# Patient Record
(demographics unavailable — no encounter records)

---

## 2025-02-23 NOTE — PHYSICAL EXAM
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

## 2025-02-23 NOTE — DATA REVIEWED
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

## 2025-02-23 NOTE — ASSESSMENT
[FreeTextEntry1] : We reviewed his CT and I again recommended that he discuss surgery with Kurt Mendieta or Arthur.

## 2025-02-23 NOTE — HISTORY OF PRESENT ILLNESS
[de-identified] : A month ago he developed R pain; an UC gave him drops with improvement of the pain, but his hearing remains decreased with fullness and worsening of baseline nonpulsatile tinnitus that has been on the R since his early 30s when he had a bad ear infection with a spontaneous perf.   Four days ago he noticed that his balance was off worse when he "itches" the R ear. Two days ago when lying in bed he had some spinning.  No headaches or other neuro complaints. HIV pos w/ an undetectable VL.

## 2025-02-24 NOTE — DATA REVIEWED
[de-identified] : Audiogram personally reviewed and interpreted and my findings are as follows (2/11/25): Right: SRT 50dB; %; Type A tymp; max CHL Left: SRT 15dB; %; Type A tymp; normal to mild high freq SNHL [de-identified] : CT temporal bone scan slides visually reviewed and personally interpreted as follows (2/13/25): right mastoid opacification, incus eroded, otic capsule intact, scutal erosion, thin tegmen tymani, facial nerve likely not dehiscence; normal left temporal bone  CT temporal bone scan radiology read: IMPRESSION:  Findings suggestive of pars flaccida cholesteatoma with the opacification of Prussak space and erosion of the scutum and opacification extending to fill the mastoid cavity and epitympanum. Sinus tympani and the facial nerve recess are opacified. Erosive changes are noted in the ossicles, most clearly involving the malleus and incus. Thinning and questionable dehiscence of the tegmen mastoideum.  No definite dehiscence of the semicircular canals of the right ear. Facial nerve canal appears relatively well preserved. Small amount of soft tissue opacification of the right round window is noted.

## 2025-02-24 NOTE — PHYSICAL EXAM
[TextEntry] : General: AAO, no significant distress Psychiatric: affect pleasant and within normal limits Eyes: relatively symmetric, no obvious nystagmus Skin: no significant lesions on face Nose: no significant lesions; patent. Oral Cavity & Oropharynx: no significant deformity or lesions Neck/Lymphatics: no significant masses or abnormal cervical nodes palpated Respiratory: breathing comfortably; no significant distress Neurologic: cranial nerves II-XII grossly intact; EOMI Facial function: symmetric   Ear examination performed under binocular otologic microscope: Left Ear External: Pinna and periauricular area is normal. Canal: Ear canal skin is not inflamed or edematous. Tympanic Membrane: Intact and in good position.   Right Ear External: Pinna and periauricular area is normal. Canal: Ear canal skin is not inflamed or edematous. Cerumen and otorrhea removed. Tympanic Membrane: Intact and in good position. R prussake cholesteatoma with otorrhea.  Procedure: Right cerumen removal Pre-operative Diagnosis: Right cerumen impaction Post-operative Diagnosis: Right cerumen impaction Anesthesia: None Procedure Details: The patient was placed in the supine position. The right ear canal was determined to be impacted with cerumen. A curette and/or suction was used to remove the cerumen impaction under microscopy. Condition: Stable. Patient tolerated procedure well. Complications: None.

## 2025-02-24 NOTE — HISTORY OF PRESENT ILLNESS
[de-identified] : 52M who presents with R cholesteatoma. He was evaluated by Dr. Ledezma and underwent audiogram and CT temporal bone scan. He has a history of R ear tubes when he was younger and chronic hearing issues for the past few years. No otalgia, vertigo, tinnitus, acute hearing changes. His taste has changed on the right already. He has HIV with an undetectable viral load.

## 2025-02-24 NOTE — HISTORY OF PRESENT ILLNESS
[de-identified] : 52M who presents with R cholesteatoma. He was evaluated by Dr. Ledezma and underwent audiogram and CT temporal bone scan. He has a history of R ear tubes when he was younger and chronic hearing issues for the past few years. No otalgia, vertigo, tinnitus, acute hearing changes. His taste has changed on the right already. He has HIV with an undetectable viral load.

## 2025-02-24 NOTE — DATA REVIEWED
[de-identified] : Audiogram personally reviewed and interpreted and my findings are as follows (2/11/25): Right: SRT 50dB; %; Type A tymp; max CHL Left: SRT 15dB; %; Type A tymp; normal to mild high freq SNHL [de-identified] : CT temporal bone scan slides visually reviewed and personally interpreted as follows (2/13/25): right mastoid opacification, incus eroded, otic capsule intact, scutal erosion, thin tegmen tymani, facial nerve likely not dehiscence; normal left temporal bone  CT temporal bone scan radiology read: IMPRESSION:  Findings suggestive of pars flaccida cholesteatoma with the opacification of Prussak space and erosion of the scutum and opacification extending to fill the mastoid cavity and epitympanum. Sinus tympani and the facial nerve recess are opacified. Erosive changes are noted in the ossicles, most clearly involving the malleus and incus. Thinning and questionable dehiscence of the tegmen mastoideum.  No definite dehiscence of the semicircular canals of the right ear. Facial nerve canal appears relatively well preserved. Small amount of soft tissue opacification of the right round window is noted.

## 2025-02-24 NOTE — ASSESSMENT
[FreeTextEntry1] : Cholesteatoma - 1 chronic illness that poses a threat to life/bodily function (see risks of cholesteatoma below) - decision regarding elective major surgery with identified patient risk factors (HIV+) - discussed options including observation vs surgery - discussed how observation does not solve the problem of cholesteatoma and the patient will continue to have recurrent ear infections and will be at increased risk of meningitis, brain abscess, other infections of the brain, and even death - discussed how the primary goal of surgery is to remove the cholesteatoma in its entirety; a secondary goal is to improve the patient's hearing if possible - Risks of cholesteatoma resection during tympanoplasty with or without mastoidectomy discussed in detail and patient's questions answered. Risks include, but are not limited to: bleeding; infection; perforation of eardrum; hearing loss; dizziness; tinnitus (ringing); facial weakness; change in taste; numbness of the ear; recurrent infection or disease with possible need for further surgery. In operations that create a mastoid cavity, widening of the ear canal (meatoplasty) is required. - book for R tmastoid, OCR, fascia graft, cartilage graft, FN monitoring  Right Cerumen Impaction - 1 chronic illness - Right cerumen impaction removed under microscopy with instrumentation

## 2025-04-18 NOTE — HISTORY OF PRESENT ILLNESS
[de-identified] : 52M who presents with R cholesteatoma. He was evaluated by Dr. Ledezma and underwent audiogram and CT temporal bone scan. He has a history of R ear tubes when he was younger and chronic hearing issues for the past few years. No otalgia, vertigo, tinnitus, acute hearing changes. His taste has changed on the right already. He has HIV with an undetectable viral load.   4/4/25: POV1 s/p R CWU tmastoid + OCR ([facility] POR[facility]), tragal cartilage graft, fascia graft, meatoplasty, sacrifice of chorda tympani on 3/28/25. No issues since surgery. No pain.  4/18/25: POV2 s/p R CWU tmastoid + OCR ([facility] PORP), tragal cartilage graft, fascia graft, meatoplasty, sacrifice of chorda tympani on 3/28/25. Preop with metallic taste, immediately post-op did not have metallic taste but it started to come back for the past few days.

## 2025-04-18 NOTE — DATA REVIEWED
[de-identified] : Audiogram personally reviewed and interpreted and my findings are as follows (2/11/25): Right: SRT 50dB; %; Type A tymp; max CHL Left: SRT 15dB; %; Type A tymp; normal to mild high freq SNHL [de-identified] : CT temporal bone scan slides visually reviewed and personally interpreted as follows (2/13/25): right mastoid opacification, incus eroded, otic capsule intact, scutal erosion, thin tegmen tymani, facial nerve likely not dehiscence; normal left temporal bone  CT temporal bone scan radiology read: IMPRESSION:  Findings suggestive of pars flaccida cholesteatoma with the opacification of Prussak space and erosion of the scutum and opacification extending to fill the mastoid cavity and epitympanum. Sinus tympani and the facial nerve recess are opacified. Erosive changes are noted in the ossicles, most clearly involving the malleus and incus. Thinning and questionable dehiscence of the tegmen mastoideum.  No definite dehiscence of the semicircular canals of the right ear. Facial nerve canal appears relatively well preserved. Small amount of soft tissue opacification of the right round window is noted.

## 2025-04-18 NOTE — ASSESSMENT
[FreeTextEntry1] : Cholesteatoma - RIGHT - 1 chronic illness that poses a threat to life/bodily function (see risks of cholesteatoma below) - s/p R CWU tmastoid + OCR ([facility] PORP), tragal cartilage graft, fascia graft, meatoplasty, sacrifice of chorda tympani on 3/28/25 - POV1 4/4/25, 4/18/25 - f/u in 1 month, audiogram should be in beginning of July 2025

## 2025-04-18 NOTE — PHYSICAL EXAM
[TextEntry] : General: AAO, no significant distress Psychiatric: affect pleasant and within normal limits Eyes: relatively symmetric, no obvious nystagmus Skin: no significant lesions on face Nose: no significant lesions; patent. Oral Cavity & Oropharynx: no significant deformity or lesions Neck/Lymphatics: no significant masses or abnormal cervical nodes palpated Respiratory: breathing comfortably; no significant distress Neurologic: cranial nerves II-XII grossly intact; EOMI Facial function: symmetric   Ear examination performed under binocular otologic microscope: Left Ear External: Pinna and periauricular area is normal. Canal: Ear canal skin is not inflamed or edematous. Tympanic Membrane: Intact and in good position.   Right Ear External: Pinna and periauricular area is normal. Canal: Ear canal skin is not inflamed or edematous. Tympanic Membrane: vascular strip and tympanomeatal flap healing well Postauricular incision clean, dry, intact

## 2025-05-16 NOTE — HISTORY OF PRESENT ILLNESS
[de-identified] : 52M who presents with R cholesteatoma. He was evaluated by Dr. Ledezma and underwent audiogram and CT temporal bone scan. He has a history of R ear tubes when he was younger and chronic hearing issues for the past few years. No otalgia, vertigo, tinnitus, acute hearing changes. His taste has changed on the right already. He has HIV with an undetectable viral load.   4/4/25: POV1 s/p R CWU tmastoid + OCR ([facility] PORP), tragal cartilage graft, fascia graft, meatoplasty, sacrifice of chorda tympani on 3/28/25. No issues since surgery. No pain.  4/18/25: POV2 s/p R CWU tmastoid + OCR ([facility] PORP), tragal cartilage graft, fascia graft, meatoplasty, sacrifice of chorda tympani on 3/28/25. Preop with metallic taste, immediately post-op did not have metallic taste but it started to come back for the past few days.   5/16/25: POV3 s/p R CWU tmastoid + OCR ([facility] PORP), tragal cartilage graft, fascia graft, meatoplasty, sacrifice of chorda tympani on 3/28/25. No issues since last visit.

## 2025-05-16 NOTE — DATA REVIEWED
[de-identified] : Audiogram personally reviewed and interpreted and my findings are as follows (2/11/25): Right: SRT 50dB; %; Type A tymp; max CHL Left: SRT 15dB; %; Type A tymp; normal to mild high freq SNHL [de-identified] : CT temporal bone scan slides visually reviewed and personally interpreted as follows (2/13/25): right mastoid opacification, incus eroded, otic capsule intact, scutal erosion, thin tegmen tymani, facial nerve likely not dehiscence; normal left temporal bone  CT temporal bone scan radiology read: IMPRESSION:  Findings suggestive of pars flaccida cholesteatoma with the opacification of Prussak space and erosion of the scutum and opacification extending to fill the mastoid cavity and epitympanum. Sinus tympani and the facial nerve recess are opacified. Erosive changes are noted in the ossicles, most clearly involving the malleus and incus. Thinning and questionable dehiscence of the tegmen mastoideum.  No definite dehiscence of the semicircular canals of the right ear. Facial nerve canal appears relatively well preserved. Small amount of soft tissue opacification of the right round window is noted.

## 2025-05-16 NOTE — PHYSICAL EXAM
[TextEntry] : General: AAO, no significant distress Psychiatric: affect pleasant and within normal limits Eyes: relatively symmetric, no obvious nystagmus Skin: no significant lesions on face Nose: no significant lesions; patent. Oral Cavity & Oropharynx: no significant deformity or lesions Neck/Lymphatics: no significant masses or abnormal cervical nodes palpated Respiratory: breathing comfortably; no significant distress Neurologic: cranial nerves II-XII grossly intact; EOMI Facial function: symmetric   Ear examination performed under binocular otologic microscope: Left Ear External: Pinna and periauricular area is normal. Canal: Ear canal skin is not inflamed or edematous. Tympanic Membrane: Intact and in good position.   Right Ear External: Pinna and periauricular area is normal. Canal: Ear canal skin is not inflamed or edematous. Tympanic Membrane: Intact and in good position. Earwax and crusting removed. Silver nitrate applied to mucosalized region of ear canal posteriorly Postauricular incision clean, dry, intact

## 2025-05-16 NOTE — ASSESSMENT
[FreeTextEntry1] : Cholesteatoma - RIGHT - 1 chronic illness that poses a threat to life/bodily function (see risks of cholesteatoma below) - s/p R CWU tmastoid + OCR ([facility] PORP), tragal cartilage graft, fascia graft, meatoplasty, sacrifice of chorda tympani on 3/28/25 - POV1 4/4/25, 4/18/25, 5/6/25 - ciprodex drops for 1 more week - stop drops after 1 week - f/u in 1.5 months with audiogram

## 2025-07-18 NOTE — DATA REVIEWED
[de-identified] : Audiogram personally reviewed and interpreted and my findings are as follows (7/18/25): Right: SRT 25dB; %; Type As tymp; normal down to severe MHL Left: SRT 20dB; WRS 90%; Type A tymp; normal down to mild SNHL  Audiogram personally reviewed and interpreted and my findings are as follows (2/11/25): Right: SRT 50dB; %; Type A tymp; max CHL Left: SRT 15dB; %; Type A tymp; normal to mild high freq SNHL  [de-identified] : CT temporal bone scan slides visually reviewed and personally interpreted as follows (2/13/25): right mastoid opacification, incus eroded, otic capsule intact, scutal erosion, thin tegmen tymani, facial nerve likely not dehiscence; normal left temporal bone  CT temporal bone scan radiology read: IMPRESSION:  Findings suggestive of pars flaccida cholesteatoma with the opacification of Prussak space and erosion of the scutum and opacification extending to fill the mastoid cavity and epitympanum. Sinus tympani and the facial nerve recess are opacified. Erosive changes are noted in the ossicles, most clearly involving the malleus and incus. Thinning and questionable dehiscence of the tegmen mastoideum.  No definite dehiscence of the semicircular canals of the right ear. Facial nerve canal appears relatively well preserved. Small amount of soft tissue opacification of the right round window is noted.

## 2025-07-18 NOTE — HISTORY OF PRESENT ILLNESS
[de-identified] : 52M who presents with R cholesteatoma. He was evaluated by Dr. Ledezma and underwent audiogram and CT temporal bone scan. He has a history of R ear tubes when he was younger and chronic hearing issues for the past few years. No otalgia, vertigo, tinnitus, acute hearing changes. His taste has changed on the right already. He has HIV with an undetectable viral load.   4/4/25: POV1 s/p R CWU tmastoid + OCR ([facility] PORP), tragal cartilage graft, fascia graft, meatoplasty, sacrifice of chorda tympani on 3/28/25. No issues since surgery. No pain.  4/18/25: POV2 s/p R CWU tmastoid + OCR ([facility] PORP), tragal cartilage graft, fascia graft, meatoplasty, sacrifice of chorda tympani on 3/28/25. Preop with metallic taste, immediately post-op did not have metallic taste but it started to come back for the past few days.   5/16/25: POV3 s/p R CWU tmastoid + OCR ([facility] PORP), tragal cartilage graft, fascia graft, meatoplasty, sacrifice of chorda tympani on 3/28/25. No issues since last visit.   7/18/25: s/p R CWU tmastoid + OCR ([facility] PORP), tragal cartilage graft, fascia graft, meatoplasty, sacrifice of chorda tympani on 3/28/25. Hearing is much improved. He now feels like his right ear is muffle and plugged which was an issue he had when he was younger due to eustachina tube dysfunction.

## 2025-07-18 NOTE — PHYSICAL EXAM
[TextEntry] : General: AAO, no significant distress Psychiatric: affect pleasant and within normal limits Eyes: relatively symmetric, no obvious nystagmus Skin: no significant lesions on face Nose: no significant lesions; patent. Oral Cavity & Oropharynx: no significant deformity or lesions Neck/Lymphatics: no significant masses or abnormal cervical nodes palpated Respiratory: breathing comfortably; no significant distress Neurologic: cranial nerves II-XII grossly intact; EOMI Facial function: symmetric   Ear examination performed under binocular otologic microscope: Left Ear External: Pinna and periauricular area is normal. Canal: Ear canal skin is not inflamed or edematous. Tympanic Membrane: Intact and in good position.   Right Ear External: Pinna and periauricular area is normal. Canal: Ear canal skin is not inflamed or edematous. Tympanic Membrane: Intact and in good position.   Nasal Endoscopy:   Pre-operative Diagnosis: allergic rhinitis Post-operative Diagnosis: same Anesthesia: None Procedure: Bilateral nasal endoscopy Procedure Details:   The patient was placed in the seated position sitting straight up. The telescope was passed along the left nasal floor to the nasopharynx. It was then passed into the region of the middle meatus, middle turbinate, and the sphenoethmoid region. An identical procedure was performed on the right side.   Findings: Interior nasal cavity: normal bilaterally Middle and superior meatus: normal bilaterally Sphenoethmoidal recess: normal bilaterally Mucosa: normal bilaterally Nasal septum: normal Discharge: none bilaterally Turbinates: ENLARGED AND BOGGY INFERIOR TURBINATES BILATERALLY Adenoid: normal bilaterally Posterior choanae: normal bilaterally Eustachian tubes: normal bilaterally Mucous stranding: COPIOUS SECRETIONS AND STRANDING BILATERALLY Lesions: Not present     Comments:   Condition: Stable. Patient tolerated procedure well.

## 2025-07-18 NOTE — ASSESSMENT
[FreeTextEntry1] : Cholesteatoma - RIGHT - 1 chronic illness that poses a threat to life/bodily function (see risks of cholesteatoma below) - s/p R CWU tmastoid + OCR ([facility] PORP), tragal cartilage graft, fascia graft, meatoplasty, sacrifice of chorda tympani on 3/28/25 - POV 4/4/25, 4/18/25, 5/6/25, 7/18/25 - audiogram reviewed with patient - hearing much improved on right, left stable - MRI IAC w/wo contrast, DWI protocol to check for cholesteatoma - f/u in 1 year after MRI + audiogram  R ETD / Chronic rhinitis - 2 chronic illness - start flonase BID, if needed add NSI, and then NSI+flonase 8 sprays if needed  R MHL + L SNHL - continue to monitor - yearly audiogram